# Patient Record
Sex: FEMALE | Race: WHITE | NOT HISPANIC OR LATINO | URBAN - METROPOLITAN AREA
[De-identification: names, ages, dates, MRNs, and addresses within clinical notes are randomized per-mention and may not be internally consistent; named-entity substitution may affect disease eponyms.]

---
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
## 2021-05-30 NOTE — ANESTHESIA CARE TRANSFER NOTE
Last vitals:   Vitals:    07/09/19 1445   BP: 110/59   Pulse: 95   Resp: 20   Temp: 36.7  C (98.1  F)   SpO2: 100%     Patient's level of consciousness is drowsy  Spontaneous respirations: yes  Maintains airway independently: yes  Dentition unchanged: yes  Oropharynx: oropharynx clear of all foreign objects    QCDR Measures:  ASA# 20 - Surgical Safety Checklist: WHO surgical safety checklist completed prior to induction    PQRS# 430 - Adult PONV Prevention: 4558F - Pt received => 2 anti-emetic agents (different classes) preop & intraop  ASA# 8 - Peds PONV Prevention: NA - Not pediatric patient, not GA or 2 or more risk factors NOT present  PQRS# 424 - Claudia-op Temp Management: 4559F - At least one body temp DOCUMENTED => 35.5C or 95.9F within required timeframe  PQRS# 426 - PACU Transfer Protocol: - Transfer of care checklist used  ASA# 14 - Acute Post-op Pain: ASA14B - Patient did NOT experience pain >= 7 out of 10

## 2021-05-30 NOTE — ANESTHESIA PREPROCEDURE EVALUATION
Anesthesia Evaluation      Patient summary reviewed   No history of anesthetic complications     Airway   Mallampati: II  Neck ROM: full   Pulmonary - negative ROS and normal exam    breath sounds clear to auscultation                         Cardiovascular - negative ROS  Exercise tolerance: < 4 METS (Wheelchair bound  )  (-) murmur  Rhythm: regular  Rate: normal,    no murmur      Neuro/Psych    (+) neuromuscular disease (spastic quadriplegia),  anxiety/panic attacks,     Comments: Congenital cerebral palsy      Endo/Other - negative ROS      GI/Hepatic/Renal - negative ROS           Dental - normal exam                        Anesthesia Plan  Planned anesthetic: general endotracheal  Preop anxiolytic (PO midazolam 20 mg).  Inhalation induction and PIV placement in OR.  GETA.  Nasal intubation w/ glidescope.  Decadron and zofran for PONV ppx.  ASA 3   Induction: inhalational   Anesthetic plan and risks discussed with: patient and parent/guardian  Anesthesia plan special considerations: video-assisted, antiemetics,   Post-op plan: routine recovery

## 2021-05-30 NOTE — ANESTHESIA POSTPROCEDURE EVALUATION
Patient: Dontrell Trujilloabenikko  REMOVAL OF COMPLETE BONY IMPACTIONS 1,16,17 AND 32, EXTRACTION OF ERUPTED TOOTH #18  Anesthesia type: general    Patient location: PACU  Last vitals:   Vitals Value Taken Time   /65 7/9/2019  3:15 PM   Temp 36.7  C (98.1  F) 7/9/2019  2:45 PM   Pulse 94 7/9/2019  3:22 PM   Resp 15 7/9/2019  3:22 PM   SpO2 95 % 7/9/2019  3:22 PM   Vitals shown include unvalidated device data.  Post vital signs: stable  Level of consciousness: awake and responds to simple questions  Post-anesthesia pain: pain controlled  Post-anesthesia nausea and vomiting: no  Pulmonary: unassisted, return to baseline  Cardiovascular: stable and blood pressure at baseline  Hydration: adequate  Anesthetic events: no    QCDR Measures:  ASA# 11 - Claudia-op Cardiac Arrest: ASA11B - Patient did NOT experience unanticipated cardiac arrest  ASA# 12 - Claudia-op Mortality Rate: ASA12B - Patient did NOT die  ASA# 13 - PACU Re-Intubation Rate: ASA13B - Patient did NOT require a new airway mgmt  ASA# 10 - Composite Anes Safety: ASA10A - No serious adverse event    Additional Notes:

## 2021-06-03 VITALS — HEIGHT: 51 IN | BODY MASS INDEX: 22.82 KG/M2 | WEIGHT: 85 LBS

## 2021-07-03 NOTE — ADDENDUM NOTE
Addendum Note by Lucero Maciel CRNA at 7/9/2019  5:22 PM     Author: Lucero Maciel CRNA Service: -- Author Type: Nurse Anesthetist    Filed: 7/9/2019  5:22 PM Date of Service: 7/9/2019  5:22 PM Status: Signed    : Lucero Maciel CRNA (Nurse Anesthetist)       Addendum  created 07/09/19 1722 by Lucero Maciel CRNA    Intraprocedure Meds edited

## 2022-05-31 ENCOUNTER — TELEPHONE (OUTPATIENT)
Dept: SLEEP MEDICINE | Facility: CLINIC | Age: 22
End: 2022-05-31

## 2022-05-31 NOTE — TELEPHONE ENCOUNTER
Reason for call:  Other   Patient called regarding (reason for call): call back  Additional comments:   Referred by Dr Gopal Mccord from [place]. He thinks psychiatrist for mood disorder and possibly needs SSRI's. Patient cannot wait until Sept. Was referred specifically to Dr Herron. I was not sure if Dr Herron or Dr Croft was the appropriate provider.  Please call Mom to disucss.    Phone number to reach patient:  Cell number on file:    No relevant phone numbers on file.       Best Time:  Any    Can we leave a detailed message on this number?  YES    Travel screening: Not Applicable

## 2022-06-01 NOTE — TELEPHONE ENCOUNTER
Called pt and was unable to reach. LM for pt to call back to get schedule for next available.    Maya RUIZA